# Patient Record
Sex: FEMALE | Race: WHITE
[De-identification: names, ages, dates, MRNs, and addresses within clinical notes are randomized per-mention and may not be internally consistent; named-entity substitution may affect disease eponyms.]

---

## 2021-06-12 ENCOUNTER — HOSPITAL ENCOUNTER (EMERGENCY)
Dept: HOSPITAL 43 - DL.ED | Age: 56
Discharge: HOME | End: 2021-06-12
Payer: COMMERCIAL

## 2021-06-12 DIAGNOSIS — S01.01XA: Primary | ICD-10-CM
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
## 2021-06-12 DIAGNOSIS — Z23: ICD-10-CM

## 2021-06-12 NOTE — EDM.PDOC
Scribed by Juliana Garcia 06/12/21 1240 for Tavares Longo MD





ED HPI GENERAL MEDICAL PROBLEM





- General


Chief Complaint: Head Injury


Stated Complaint: FELL AND HIT HEAD ON DOCK [id number]


Time Seen by Provider: 06/12/21 12:20


Source of Information: Reports: Patient, RN, RN Notes Reviewed


History Limitations: Reports: No Limitations





- History of Present Illness


INITIAL COMMENTS - FREE TEXT/NARRATIVE: 


Patient presents to ED by POV with complaint of slipping on dock while 

attempting to tie up boat hitting back of head on the cement causing a 

laceration. Patient denies losws of consciousness. 


Onset: Today


Duration: Constant


Location: Reports: Head


Quality: Reports: Ache


Severity: Moderate


Improves with: Reports: None


Worsens with: Reports: None


Associated Symptoms: Reports: No Other Symptoms





- Related Data


                                    Allergies











Allergy/AdvReac Type Severity Reaction Status Date / Time


 


No Known Allergies Allergy   Verified 06/12/21 12:26











Home Meds: 


                                    Home Meds





Levothyroxine Sodium [Synthroid] 125 mcg PO DAILY 06/12/21 [History]


Semaglutide [Ozempic] 1 mg SQ WEEKLY 06/12/21 [History]


glipiZIDE [Glipizide ER] 5 mg PO DAILY 06/12/21 [History]


metFORMIN [Glucophage] 500 mg PO BID 06/12/21 [History]











ED ROS GENERAL





- Review of Systems


Review Of Systems: Comprehensive ROS is negative, except as noted in HPI.





ED EXAM, HEAD INJURY





- Physical Exam


Exam: See Below


Exam Limited By: No Limitations


General Appearance: Alert, WD/WN, No Apparent Distress


Head: Normocephalic, Scalp Lacerations (3.5cm "T" shaped laceration to depth of 

subcutaneous tissue), Scalp Tenderness


Nexus Criteria: No: Posterior, Midline Cervical Tenderness, Evidence of 

Intoxication, Altered Level of Consciousness, Focal Neurological Deficit, 

Painful Distraction Injuries


Eyes: Bilateral Eye: Normal Inspection


Ears: Normal External Exam, Normal Canal, Hearing Grossly Normal, Normal TMs.  

No: Canal Blood, TM Blood, TM Fluid


Nose: Normal Inspection, Normal Mucousa, No Blood


Throat/Mouth: Normal Inspection, Normal Lips, Normal Teeth, Normal Gums, Normal 

Oropharynx, Normal Voice, No Airway Compromise


Neck: Non-Tender, Full Range of Motion, Normal Alignment, Normal Inspection


Respiratory: No Respiratory Distress


Cardiovascular: Regular Rate, Rhythm, Tachycardia


Back Exam: Normal Inspection


Extremities: Normal Inspection


Neurologic: CNs II-XII nml As Tested, No Motor/Sensory Deficits, Alert, Normal 

Mood/Affect, Oriented x 3


Skin: Normal Color, Warm/Dry





- Sy Coma Score


Best Eye Response (Sy): (4) Open Spontaneously


Best Verbal Response ([place]): (5) Oriented


Best Motor Response (Sy): (6) Obeys Commands


[place] Total: 15





ED LACERATION/WOUND & LOU PROC





- Laceration/Wound Repair


  ** Posterior Head


Lac/wound length in cm: 3.5


Appearance: Subcutaneous, Irregular, Clean


Distal NVT: Neuro & Vascular Intact


Anesthetic Type: Local


Local Anesthesia - Lidocaine (Xylocaine): 1% Plain


Local Anesthetic Volume: Other (20)


Skin Prep: Chlorhexidine (Hibiciens), Saline


Exploration/Debridement/Repair: Wound Explored, In a Bloodless Field, Explored 

to Base, Minimal Debridement, Minimally Undermined


Closed with: Staples


# of Sutures: 9


Suture Type: Interrupted


Drain Placement: No


Sterile Dressing Applied: Nurse


Tetanus Status Addressed: Yes


Complications: No





Course





- Vital Signs


Last Recorded V/S: 


                                Last Vital Signs











Temp  97.6 F   06/12/21 12:33


 


Pulse  112 H  06/12/21 12:33


 


Resp  18   06/12/21 12:33


 


BP  144/87 H  06/12/21 12:33


 


Pulse Ox  98   06/12/21 12:33














- Orders/Labs/Meds


Orders: 


                               Active Orders 24 hr











 Category Date Time Status


 


 Vaccines to be Administered [RC] PER UNIT ROUTINE Care  06/12/21 12:27 Ordered


 


 Diphth,Pertuss(Acell),Tet Vac [Boostrix] Med  06/12/21 12:26 Once





 0.5 ml IM .ONCE ONE   


 


 Lidocaine 1% [Xylocaine-MPF 1%] Med  06/12/21 12:26 Once





 30 ml INJECT ONETIME ONE   








                                Medication Orders





Diphtheria/Tetanus/Acell Pertussis (Diphtheria,Pertussis(Acell),Tetanus Vaccine 

0.5 Ml Syringe)  0.5 ml IM .ONCE ONE


   Stop: 06/12/21 12:27


Lidocaine HCl (Lidocaine 1% 30 Ml Sdv)  30 ml INJECT ONETIME ONE


   Stop: 06/12/21 12:27








Meds: 


Medications











Generic Name Dose Route Start Last Admin





  Trade Name Freq  PRN Reason Stop Dose Admin


 


Diphtheria/Tetanus/Acell Pertussis  0.5 ml  06/12/21 12:26 





  Diphtheria,Pertussis(Acell),Tetanus Vaccine 0.5 Ml Syringe  IM  06/12/21 12:27







  .ONCE ONE  


 


Lidocaine HCl  30 ml  06/12/21 12:26 





  Lidocaine 1% 30 Ml Sdv  INJECT  06/12/21 12:27 





  ONETIME ONE  














Departure





- Departure


Time of Disposition: 12:38


Disposition: Home, Self-Care 01


Condition: Good


Clinical Impression: 


Occipital scalp laceration


Qualifiers:


 Encounter type: initial encounter Qualified Code(s): S01.01XA - Laceration 

without foreign body of scalp, initial encounter








- Discharge Information


*PRESCRIPTION DRUG MONITORING PROGRAM REVIEWED*: Not Applicable


*COPY OF PRESCRIPTION DRUG MONITORING REPORT IN PATIENT CESAR: Not Applicable


Instructions:  Sutures, Staples, or Adhesive Wound Closure


Forms:  ED Department Discharge


Additional Instructions: 


May shampoo, but do not soak the laceration, and blot dry. No swimming, hot tub,

ect.


Follow up in clinic in 7 to 10 days for staple removal.





Sepsis Event Note (ED)





- Focused Exam


Vital Signs: 


                                   Vital Signs











  Temp Pulse Resp BP Pulse Ox


 


 06/12/21 12:33  97.6 F  112 H  18  144/87 H  98














- My Orders


Last 24 Hours: 


My Active Orders





06/12/21 12:26


Diphth,Pertuss(Acell),Tet Vac [Boostrix]   0.5 ml IM .ONCE ONE 


Lidocaine 1% [Xylocaine-MPF 1%]   30 ml INJECT ONETIME ONE 





06/12/21 12:27


Vaccines to be Administered [RC] PER UNIT ROUTINE 














- Assessment/Plan


Last 24 Hours: 


My Active Orders





06/12/21 12:26


Diphth,Pertuss(Acell),Tet Vac [Boostrix]   0.5 ml IM .ONCE ONE 


Lidocaine 1% [Xylocaine-MPF 1%]   30 ml INJECT ONETIME ONE 





06/12/21 12:27


Vaccines to be Administered [RC] PER UNIT ROUTINE 














I have read and agree with the documentation that has been completed regarding 

this visit. By signing this record, I attest that the documentation was 

completed in my physical presence and is an accurate record of the encounter.